# Patient Record
Sex: FEMALE | Race: WHITE | NOT HISPANIC OR LATINO | URBAN - METROPOLITAN AREA
[De-identification: names, ages, dates, MRNs, and addresses within clinical notes are randomized per-mention and may not be internally consistent; named-entity substitution may affect disease eponyms.]

---

## 2017-02-16 ENCOUNTER — OFFICE (OUTPATIENT)
Dept: URBAN - METROPOLITAN AREA CLINIC 22 | Facility: CLINIC | Age: 82
End: 2017-02-16

## 2017-02-16 ENCOUNTER — OFFICE (OUTPATIENT)
Dept: URBAN - METROPOLITAN AREA CLINIC 11 | Facility: CLINIC | Age: 82
End: 2017-02-16
Payer: MEDICARE

## 2017-02-16 VITALS
WEIGHT: 154 LBS | SYSTOLIC BLOOD PRESSURE: 145 MMHG | HEART RATE: 69 BPM | DIASTOLIC BLOOD PRESSURE: 77 MMHG | HEIGHT: 61 IN

## 2017-02-16 DIAGNOSIS — R10.13 EPIGASTRIC PAIN: ICD-10-CM

## 2017-02-16 DIAGNOSIS — I10 ESSENTIAL (PRIMARY) HYPERTENSION: ICD-10-CM

## 2017-02-16 LAB — CREATININE (GTSC): CREATININE: 1.2 MG/DL (ref 0.6–1.3)

## 2017-02-16 PROCEDURE — G8427 DOCREV CUR MEDS BY ELIG CLIN: HCPCS

## 2017-02-16 PROCEDURE — 74160 CT ABDOMEN W/CONTRAST: CPT | Mod: TC

## 2017-02-16 PROCEDURE — 85027 COMPLETE CBC AUTOMATED: CPT

## 2017-02-16 PROCEDURE — 99213 OFFICE O/P EST LOW 20 MIN: CPT

## 2017-02-17 LAB
COMPREHENSIVE METABOLIC PANEL: ALBUMIN: 4.2 G/DL (ref 3.5–5.2)
COMPREHENSIVE METABOLIC PANEL: ALKALINE PHOSPHATASE: 62 U/L (ref 34–115)
COMPREHENSIVE METABOLIC PANEL: CALCIUM TOTAL: 10.7 MG/DL — HIGH (ref 8.5–10.5)
COMPREHENSIVE METABOLIC PANEL: CARBON DIOXIDE: 27 MEQ/L (ref 21–31)
COMPREHENSIVE METABOLIC PANEL: CHLORIDE: 102 MEQ/L (ref 96–106)
COMPREHENSIVE METABOLIC PANEL: CREATININE: 1.01 MG/DL (ref 0.6–1.3)
COMPREHENSIVE METABOLIC PANEL: FASTING/NON-FASTING: (no result)
COMPREHENSIVE METABOLIC PANEL: GLUCOSE: 108 MG/DL — HIGH (ref 65–100)
COMPREHENSIVE METABOLIC PANEL: POTASSIUM: 4.7 MEQ/ML (ref 3.5–5.4)
COMPREHENSIVE METABOLIC PANEL: SGOT (AST): 13 U/L (ref 13–40)
COMPREHENSIVE METABOLIC PANEL: SGPT (ALT): 18 U/L (ref 7–52)
COMPREHENSIVE METABOLIC PANEL: SODIUM: 143 MEQ/L (ref 135–145)
COMPREHENSIVE METABOLIC PANEL: TOTAL BILIRUBIN: 0.4 MG/DL (ref 0.3–1.2)
COMPREHENSIVE METABOLIC PANEL: TOTAL PROTEIN: 6.3 G/DL — LOW (ref 6.4–8.3)
COMPREHENSIVE METABOLIC PANEL: UREA NITROGEN: 23 MG/DL (ref 8–23)

## 2019-05-02 ENCOUNTER — OFFICE (OUTPATIENT)
Dept: URBAN - METROPOLITAN AREA CLINIC 11 | Facility: CLINIC | Age: 84
End: 2019-05-02

## 2019-05-02 VITALS
HEART RATE: 67 BPM | WEIGHT: 140 LBS | HEIGHT: 61 IN | SYSTOLIC BLOOD PRESSURE: 144 MMHG | DIASTOLIC BLOOD PRESSURE: 79 MMHG

## 2019-05-02 DIAGNOSIS — R10.30 LOWER ABDOMINAL PAIN, UNSPECIFIED: ICD-10-CM

## 2019-05-02 PROCEDURE — 99214 OFFICE O/P EST MOD 30 MIN: CPT | Mod: 25

## 2019-05-02 PROCEDURE — 46600 DIAGNOSTIC ANOSCOPY SPX: CPT

## 2019-05-02 RX ORDER — HYOSCYAMINE SULFATE 0.12 MG/1
TABLET ORAL; SUBLINGUAL
Qty: 30 | Refills: 0 | Status: ACTIVE
Start: 2019-05-02

## 2019-05-02 RX ORDER — DICYCLOMINE HYDROCHLORIDE 10 MG/1
20 CAPSULE ORAL
Qty: 100 | Refills: 3 | Status: COMPLETED
End: 2019-05-02

## 2019-05-22 ENCOUNTER — AMBULATORY SURGICAL CENTER (OUTPATIENT)
Dept: URBAN - METROPOLITAN AREA SURGERY 3 | Facility: SURGERY | Age: 84
End: 2019-05-22

## 2019-05-22 ENCOUNTER — AMBULATORY SURGICAL CENTER (OUTPATIENT)
Dept: URBAN - METROPOLITAN AREA SURGERY 3 | Facility: SURGERY | Age: 84
End: 2019-05-22
Payer: COMMERCIAL

## 2019-05-22 VITALS
DIASTOLIC BLOOD PRESSURE: 53 MMHG | HEART RATE: 66 BPM | SYSTOLIC BLOOD PRESSURE: 107 MMHG | SYSTOLIC BLOOD PRESSURE: 107 MMHG | SYSTOLIC BLOOD PRESSURE: 107 MMHG | RESPIRATION RATE: 18 BRPM | HEIGHT: 61 IN | SYSTOLIC BLOOD PRESSURE: 113 MMHG | TEMPERATURE: 97.4 F | SYSTOLIC BLOOD PRESSURE: 94 MMHG | DIASTOLIC BLOOD PRESSURE: 52 MMHG | SYSTOLIC BLOOD PRESSURE: 88 MMHG | DIASTOLIC BLOOD PRESSURE: 52 MMHG | HEART RATE: 68 BPM | TEMPERATURE: 97.4 F | OXYGEN SATURATION: 100 % | SYSTOLIC BLOOD PRESSURE: 94 MMHG | HEART RATE: 66 BPM | WEIGHT: 132 LBS | DIASTOLIC BLOOD PRESSURE: 53 MMHG | DIASTOLIC BLOOD PRESSURE: 69 MMHG | DIASTOLIC BLOOD PRESSURE: 64 MMHG | DIASTOLIC BLOOD PRESSURE: 56 MMHG | HEART RATE: 68 BPM | OXYGEN SATURATION: 100 % | DIASTOLIC BLOOD PRESSURE: 64 MMHG | OXYGEN SATURATION: 95 % | HEART RATE: 63 BPM | RESPIRATION RATE: 15 BRPM | HEART RATE: 66 BPM | HEART RATE: 68 BPM | SYSTOLIC BLOOD PRESSURE: 102 MMHG | RESPIRATION RATE: 15 BRPM | OXYGEN SATURATION: 99 % | OXYGEN SATURATION: 100 % | SYSTOLIC BLOOD PRESSURE: 88 MMHG | HEART RATE: 64 BPM | HEART RATE: 64 BPM | SYSTOLIC BLOOD PRESSURE: 102 MMHG | DIASTOLIC BLOOD PRESSURE: 56 MMHG | HEART RATE: 58 BPM | RESPIRATION RATE: 18 BRPM | HEIGHT: 61 IN | OXYGEN SATURATION: 95 % | DIASTOLIC BLOOD PRESSURE: 53 MMHG | RESPIRATION RATE: 18 BRPM | SYSTOLIC BLOOD PRESSURE: 94 MMHG | HEART RATE: 58 BPM | HEART RATE: 63 BPM | TEMPERATURE: 97.2 F | OXYGEN SATURATION: 99 % | DIASTOLIC BLOOD PRESSURE: 69 MMHG | HEART RATE: 63 BPM | TEMPERATURE: 97.2 F | SYSTOLIC BLOOD PRESSURE: 113 MMHG | RESPIRATION RATE: 15 BRPM | WEIGHT: 132 LBS | SYSTOLIC BLOOD PRESSURE: 102 MMHG | TEMPERATURE: 97.4 F | SYSTOLIC BLOOD PRESSURE: 88 MMHG | TEMPERATURE: 97.2 F | DIASTOLIC BLOOD PRESSURE: 64 MMHG | HEIGHT: 61 IN | DIASTOLIC BLOOD PRESSURE: 69 MMHG | WEIGHT: 132 LBS | DIASTOLIC BLOOD PRESSURE: 52 MMHG | HEART RATE: 58 BPM | OXYGEN SATURATION: 95 % | DIASTOLIC BLOOD PRESSURE: 56 MMHG | HEART RATE: 64 BPM | OXYGEN SATURATION: 99 % | SYSTOLIC BLOOD PRESSURE: 113 MMHG

## 2019-05-22 DIAGNOSIS — K64.9 UNSPECIFIED HEMORRHOIDS: ICD-10-CM

## 2019-05-22 DIAGNOSIS — K62.5 HEMORRHAGE OF ANUS AND RECTUM: ICD-10-CM

## 2019-05-22 PROBLEM — K64.1 SECOND DEGREE HEMORRHOIDS: Status: ACTIVE | Noted: 2019-05-22

## 2019-05-22 PROCEDURE — 46930 DESTROY INTERNAL HEMORRHOIDS: CPT

## 2019-05-22 PROCEDURE — G8907 PT DOC NO EVENTS ON DISCHARG: HCPCS

## 2019-05-22 PROCEDURE — G8918 PT W/O PREOP ORDER IV AB PRO: HCPCS

## 2019-05-22 PROCEDURE — 45330 DIAGNOSTIC SIGMOIDOSCOPY: CPT | Mod: 51

## 2019-10-10 ENCOUNTER — OFFICE (OUTPATIENT)
Dept: URBAN - METROPOLITAN AREA CLINIC 11 | Facility: CLINIC | Age: 84
End: 2019-10-10

## 2019-10-10 VITALS
HEIGHT: 61 IN | DIASTOLIC BLOOD PRESSURE: 79 MMHG | HEART RATE: 66 BPM | WEIGHT: 138 LBS | SYSTOLIC BLOOD PRESSURE: 145 MMHG

## 2019-10-10 DIAGNOSIS — R63.4 ABNORMAL WEIGHT LOSS: ICD-10-CM

## 2019-10-10 DIAGNOSIS — K64.9 UNSPECIFIED HEMORRHOIDS: ICD-10-CM

## 2019-10-10 DIAGNOSIS — Z80.9 FAMILY HISTORY OF MALIGNANT NEOPLASM, UNSPECIFIED: ICD-10-CM

## 2019-10-10 DIAGNOSIS — R10.10 UPPER ABDOMINAL PAIN, UNSPECIFIED: ICD-10-CM

## 2019-10-10 LAB
CREATININE: 1.05 MG/DL — HIGH (ref 0.57–1)
CREATININE: EGFR IF AFRICN AM: 56 ML/MIN/1.73 — LOW (ref 59–?)
CREATININE: EGFR IF NONAFRICN AM: 49 ML/MIN/1.73 — LOW (ref 59–?)

## 2019-10-10 PROCEDURE — 99214 OFFICE O/P EST MOD 30 MIN: CPT

## 2019-10-10 RX ORDER — HYDROCORTISONE 25 MG/G
CREAM TOPICAL
Qty: 1 | Refills: 1 | Status: COMPLETED
Start: 2019-10-10 | End: 2020-07-01

## 2020-06-30 ENCOUNTER — OFFICE (OUTPATIENT)
Dept: URBAN - METROPOLITAN AREA CLINIC 11 | Facility: CLINIC | Age: 85
End: 2020-06-30

## 2020-06-30 VITALS
HEIGHT: 61 IN | SYSTOLIC BLOOD PRESSURE: 150 MMHG | DIASTOLIC BLOOD PRESSURE: 74 MMHG | WEIGHT: 130 LBS | HEART RATE: 65 BPM

## 2020-06-30 DIAGNOSIS — R10.13 EPIGASTRIC PAIN: ICD-10-CM

## 2020-06-30 LAB
CBC, PLATELET, NO DIFFERENTIAL: HEMATOCRIT: 38.6 % (ref 34–46.6)
CBC, PLATELET, NO DIFFERENTIAL: HEMOGLOBIN: 12.9 G/DL (ref 11.1–15.9)
CBC, PLATELET, NO DIFFERENTIAL: MCH: 31.2 PG (ref 26.6–33)
CBC, PLATELET, NO DIFFERENTIAL: MCHC: 33.4 G/DL (ref 31.5–35.7)
CBC, PLATELET, NO DIFFERENTIAL: MCV: 94 FL (ref 79–97)
CBC, PLATELET, NO DIFFERENTIAL: PLATELETS: 190 X10E3/UL (ref 150–450)
CBC, PLATELET, NO DIFFERENTIAL: RBC: 4.13 X10E6/UL (ref 3.77–5.28)
CBC, PLATELET, NO DIFFERENTIAL: RDW: 12.3 % (ref 11.7–15.4)
CBC, PLATELET, NO DIFFERENTIAL: WBC: 6.6 X10E3/UL (ref 3.4–10.8)
COMP. METABOLIC PANEL (14): A/G RATIO: 2.3 — HIGH (ref 1.2–2.2)
COMP. METABOLIC PANEL (14): ALBUMIN: 4.3 G/DL (ref 3.6–4.6)
COMP. METABOLIC PANEL (14): ALKALINE PHOSPHATASE: 83 IU/L (ref 39–117)
COMP. METABOLIC PANEL (14): ALT (SGPT): 13 IU/L (ref 0–32)
COMP. METABOLIC PANEL (14): AST (SGOT): 12 IU/L (ref 0–40)
COMP. METABOLIC PANEL (14): BILIRUBIN, TOTAL: 0.4 MG/DL (ref 0–1.2)
COMP. METABOLIC PANEL (14): BUN/CREATININE RATIO: 18 (ref 12–28)
COMP. METABOLIC PANEL (14): BUN: 16 MG/DL (ref 8–27)
COMP. METABOLIC PANEL (14): CALCIUM: 10.4 MG/DL — HIGH (ref 8.7–10.3)
COMP. METABOLIC PANEL (14): CARBON DIOXIDE, TOTAL: 20 MMOL/L (ref 20–29)
COMP. METABOLIC PANEL (14): CHLORIDE: 106 MMOL/L (ref 96–106)
COMP. METABOLIC PANEL (14): CREATININE: 0.9 MG/DL (ref 0.57–1)
COMP. METABOLIC PANEL (14): EGFR IF AFRICN AM: 67 ML/MIN/1.73 (ref 59–?)
COMP. METABOLIC PANEL (14): EGFR IF NONAFRICN AM: 58 ML/MIN/1.73 — LOW (ref 59–?)
COMP. METABOLIC PANEL (14): GLOBULIN, TOTAL: 1.9 G/DL (ref 1.5–4.5)
COMP. METABOLIC PANEL (14): GLUCOSE: 108 MG/DL — HIGH (ref 65–99)
COMP. METABOLIC PANEL (14): POTASSIUM: 4 MMOL/L (ref 3.5–5.2)
COMP. METABOLIC PANEL (14): PROTEIN, TOTAL: 6.2 G/DL (ref 6–8.5)
COMP. METABOLIC PANEL (14): SODIUM: 145 MMOL/L — HIGH (ref 134–144)

## 2020-06-30 PROCEDURE — 99215 OFFICE O/P EST HI 40 MIN: CPT

## 2020-07-01 ENCOUNTER — AMBULATORY SURGICAL CENTER (OUTPATIENT)
Dept: URBAN - METROPOLITAN AREA SURGERY 3 | Facility: SURGERY | Age: 85
End: 2020-07-01

## 2020-07-01 ENCOUNTER — OFFICE (OUTPATIENT)
Dept: URBAN - METROPOLITAN AREA PATHOLOGY 22 | Facility: PATHOLOGY | Age: 85
End: 2020-07-01

## 2020-07-01 ENCOUNTER — AMBULATORY SURGICAL CENTER (OUTPATIENT)
Dept: URBAN - METROPOLITAN AREA SURGERY 3 | Facility: SURGERY | Age: 85
End: 2020-07-01
Payer: MEDICARE

## 2020-07-01 VITALS
OXYGEN SATURATION: 95 % | WEIGHT: 131 LBS | RESPIRATION RATE: 22 BRPM | SYSTOLIC BLOOD PRESSURE: 139 MMHG | HEART RATE: 70 BPM | HEART RATE: 66 BPM | SYSTOLIC BLOOD PRESSURE: 140 MMHG | SYSTOLIC BLOOD PRESSURE: 140 MMHG | OXYGEN SATURATION: 96 % | OXYGEN SATURATION: 94 % | SYSTOLIC BLOOD PRESSURE: 152 MMHG | SYSTOLIC BLOOD PRESSURE: 152 MMHG | OXYGEN SATURATION: 95 % | RESPIRATION RATE: 21 BRPM | RESPIRATION RATE: 18 BRPM | DIASTOLIC BLOOD PRESSURE: 71 MMHG | DIASTOLIC BLOOD PRESSURE: 71 MMHG | RESPIRATION RATE: 18 BRPM | OXYGEN SATURATION: 94 % | RESPIRATION RATE: 21 BRPM | RESPIRATION RATE: 18 BRPM | HEART RATE: 66 BPM | OXYGEN SATURATION: 96 % | HEIGHT: 61 IN | TEMPERATURE: 97.9 F | TEMPERATURE: 97.6 F | DIASTOLIC BLOOD PRESSURE: 75 MMHG | RESPIRATION RATE: 19 BRPM | DIASTOLIC BLOOD PRESSURE: 83 MMHG | HEIGHT: 61 IN | DIASTOLIC BLOOD PRESSURE: 83 MMHG | TEMPERATURE: 97.6 F | OXYGEN SATURATION: 97 % | OXYGEN SATURATION: 97 % | WEIGHT: 131 LBS | TEMPERATURE: 97.9 F | OXYGEN SATURATION: 96 % | OXYGEN SATURATION: 95 % | SYSTOLIC BLOOD PRESSURE: 139 MMHG | DIASTOLIC BLOOD PRESSURE: 75 MMHG | SYSTOLIC BLOOD PRESSURE: 138 MMHG | RESPIRATION RATE: 22 BRPM | TEMPERATURE: 97.9 F | DIASTOLIC BLOOD PRESSURE: 83 MMHG | HEART RATE: 70 BPM | RESPIRATION RATE: 21 BRPM | OXYGEN SATURATION: 94 % | SYSTOLIC BLOOD PRESSURE: 152 MMHG | RESPIRATION RATE: 19 BRPM | RESPIRATION RATE: 22 BRPM | TEMPERATURE: 97.6 F | RESPIRATION RATE: 19 BRPM | DIASTOLIC BLOOD PRESSURE: 71 MMHG | SYSTOLIC BLOOD PRESSURE: 139 MMHG | HEIGHT: 61 IN | HEART RATE: 70 BPM | WEIGHT: 131 LBS | DIASTOLIC BLOOD PRESSURE: 75 MMHG | HEART RATE: 66 BPM | SYSTOLIC BLOOD PRESSURE: 138 MMHG | SYSTOLIC BLOOD PRESSURE: 140 MMHG | SYSTOLIC BLOOD PRESSURE: 138 MMHG | OXYGEN SATURATION: 97 %

## 2020-07-01 DIAGNOSIS — K44.9 DIAPHRAGMATIC HERNIA WITHOUT OBSTRUCTION OR GANGRENE: ICD-10-CM

## 2020-07-01 DIAGNOSIS — K29.50 UNSPECIFIED CHRONIC GASTRITIS WITHOUT BLEEDING: ICD-10-CM

## 2020-07-01 DIAGNOSIS — K21.0 GASTRO-ESOPHAGEAL REFLUX DISEASE WITH ESOPHAGITIS: ICD-10-CM

## 2020-07-01 DIAGNOSIS — K31.7 POLYP OF STOMACH AND DUODENUM: ICD-10-CM

## 2020-07-01 DIAGNOSIS — R10.13 EPIGASTRIC PAIN: ICD-10-CM

## 2020-07-01 PROBLEM — K31.89 OTHER DISEASES OF STOMACH AND DUODENUM: Status: ACTIVE | Noted: 2020-07-01

## 2020-07-01 PROCEDURE — 43239 EGD BIOPSY SINGLE/MULTIPLE: CPT

## 2020-07-01 PROCEDURE — G8918 PT W/O PREOP ORDER IV AB PRO: HCPCS

## 2020-07-01 PROCEDURE — G8907 PT DOC NO EVENTS ON DISCHARG: HCPCS

## 2020-07-01 PROCEDURE — 88342 IMHCHEM/IMCYTCHM 1ST ANTB: CPT

## 2020-07-01 PROCEDURE — 88305 TISSUE EXAM BY PATHOLOGIST: CPT

## 2020-07-01 PROCEDURE — 88313 SPECIAL STAINS GROUP 2: CPT

## 2020-07-01 NOTE — SERVICEHPINOTES
Mrs. Stahl presents with a 2 week history of a new onset epigastric cramping pain that is nonradiating in is moderately severe. It last for hours at a time. Is precipitated during the course of eating anything. There is no dysphagia. The cramping sometimes radiates into the subxiphoid area. There is significant nausea. No vomiting. No response to antacids.BRShe has recently had sciatica but was taking no NSAIDs.In light of her previous history of ulcer, I am concerned about recurrent ulcer or neoplasia.

## 2020-07-17 ENCOUNTER — ON CAMPUS - OUTPATIENT (OUTPATIENT)
Dept: URBAN - METROPOLITAN AREA HOSPITAL 97 | Facility: HOSPITAL | Age: 85
End: 2020-07-17

## 2020-07-17 DIAGNOSIS — C24.1 MALIGNANT NEOPLASM OF AMPULLA OF VATER: ICD-10-CM

## 2020-07-17 DIAGNOSIS — K83.1 OBSTRUCTION OF BILE DUCT: ICD-10-CM

## 2020-07-17 DIAGNOSIS — Z11.59 ENCOUNTER FOR SCREENING FOR OTHER VIRAL DISEASES: ICD-10-CM

## 2020-07-17 PROCEDURE — 43274 ERCP DUCT STENT PLACEMENT: CPT

## 2020-07-17 PROCEDURE — 43261 ENDO CHOLANGIOPANCREATOGRAPH: CPT | Mod: XS
